# Patient Record
Sex: MALE | Race: WHITE | NOT HISPANIC OR LATINO
[De-identification: names, ages, dates, MRNs, and addresses within clinical notes are randomized per-mention and may not be internally consistent; named-entity substitution may affect disease eponyms.]

---

## 2023-06-16 ENCOUNTER — APPOINTMENT (OUTPATIENT)
Dept: CARDIOLOGY | Facility: CLINIC | Age: 83
End: 2023-06-16
Payer: MEDICARE

## 2023-06-16 ENCOUNTER — NON-APPOINTMENT (OUTPATIENT)
Age: 83
End: 2023-06-16

## 2023-06-16 VITALS
WEIGHT: 173 LBS | SYSTOLIC BLOOD PRESSURE: 108 MMHG | HEART RATE: 72 BPM | DIASTOLIC BLOOD PRESSURE: 62 MMHG | OXYGEN SATURATION: 96 % | BODY MASS INDEX: 32.66 KG/M2 | HEIGHT: 61 IN

## 2023-06-16 DIAGNOSIS — Z87.448 PERSONAL HISTORY OF OTHER DISEASES OF URINARY SYSTEM: ICD-10-CM

## 2023-06-16 DIAGNOSIS — Z72.3 LACK OF PHYSICAL EXERCISE: ICD-10-CM

## 2023-06-16 DIAGNOSIS — Z82.49 FAMILY HISTORY OF ISCHEMIC HEART DISEASE AND OTHER DISEASES OF THE CIRCULATORY SYSTEM: ICD-10-CM

## 2023-06-16 DIAGNOSIS — Z82.0 FAMILY HISTORY OF EPILEPSY AND OTHER DISEASES OF THE NERVOUS SYSTEM: ICD-10-CM

## 2023-06-16 PROCEDURE — 99204 OFFICE O/P NEW MOD 45 MIN: CPT

## 2023-06-16 PROCEDURE — 93000 ELECTROCARDIOGRAM COMPLETE: CPT

## 2023-06-16 RX ORDER — DUTASTERIDE 0.5 MG/1
0.5 CAPSULE, LIQUID FILLED ORAL DAILY
Refills: 0 | Status: ACTIVE | COMMUNITY

## 2023-06-16 RX ORDER — AMLODIPINE BESYLATE 10 MG/1
10 TABLET ORAL DAILY
Qty: 90 | Refills: 1 | Status: ACTIVE | COMMUNITY

## 2023-06-16 RX ORDER — LOSARTAN POTASSIUM AND HYDROCHLOROTHIAZIDE 12.5; 1 MG/1; MG/1
100-12.5 TABLET ORAL DAILY
Qty: 90 | Refills: 1 | Status: ACTIVE | COMMUNITY

## 2023-06-16 RX ORDER — ASPIRIN ENTERIC COATED TABLETS 81 MG 81 MG/1
81 TABLET, DELAYED RELEASE ORAL DAILY
Qty: 90 | Refills: 0 | Status: ACTIVE | COMMUNITY

## 2023-06-27 NOTE — HISTORY OF PRESENT ILLNESS
[FreeTextEntry1] : LEIGHTON JAIME  is a 82 year old  M\par Initial cardiology consultation. \par Previously saw [facility]. Had echocardiogram. Stress test never completed.\par History of chronic kidney disease, renal mass, hypertension, hyperlipidemia. \par Prior ER evaluation for shortness of breath and hypoxia. Nonspecific EKG. \par Reported outside creatinine 1.6. \par Today's blood pressure is well controlled. \par \par There is no prior history of a clinical myocardial infarction, coronary revascularization. \par There is no history of symptomatic congestive heart failure rheumatic heart disease or valvular disease.\par There is no history of symptomatic arrhythmias including atrial fibrillation.\par \par There is no exertional chest pain, pressure or discomfort. \par There is no significant dyspnea on exertion or orthopnea. \par There are no symptomatic palpitations, lightheadedness, dizziness or syncope.\par Wants to increase his cycling.\par \par Outside echocardiogram May 2023 normal left ventricular function mild left ventricular hypertrophy aortic sclerosis mild aortic dilatation. \par August 2022 hemoglobin 17.0 potassium 3.7 creatinine 1.0 total cholesterol 193  TSH 3.8 \par EKG demonstrates normal sinus rhythm with nonspecific ST changes. \par \par Retired from [employer].

## 2023-06-27 NOTE — ADDENDUM
[FreeTextEntry1] : EKG obtained to assist in the diagnosis and management of assisted problem(s).\par \par wife report prior intol to atorva\par mult statins  with symptomatic intolerance. \par Compliant with lifestyle measures including diet exercise\par Excellent candidate for PCSK9 inhibition\par Prior authorization pending\par Will return to office with RN for initial medication administration and education.\par \par

## 2023-06-27 NOTE — ASSESSMENT
[FreeTextEntry1] : \par Prior symptoms of dyspnea. Multiple coronary risk factors. \par Outside records to be reviewed. \par Follow-up stress test. \par Start alternative statin therapy. Prior flank pain with rosuvastatin. \par Low-sodium diet. \par Hold off on exercise program until completion of stress test. \par Instructed to call if adverse effect. \par Adjunctive dietary measures. \par Monitor aortic dimensions. Blood pressure control. \par Follow-up blood work. \par

## 2023-06-28 RX ORDER — ATORVASTATIN CALCIUM 20 MG/1
20 TABLET, FILM COATED ORAL
Qty: 90 | Refills: 0 | Status: DISCONTINUED | COMMUNITY
Start: 2023-06-16 | End: 2023-06-28

## 2023-06-30 ENCOUNTER — NON-APPOINTMENT (OUTPATIENT)
Age: 83
End: 2023-06-30

## 2023-07-05 ENCOUNTER — APPOINTMENT (OUTPATIENT)
Dept: CARDIOLOGY | Facility: CLINIC | Age: 83
End: 2023-07-05
Payer: MEDICARE

## 2023-07-05 DIAGNOSIS — R06.00 DYSPNEA, UNSPECIFIED: ICD-10-CM

## 2023-07-05 PROCEDURE — A9502: CPT

## 2023-07-05 PROCEDURE — 93015 CV STRESS TEST SUPVJ I&R: CPT

## 2023-07-05 PROCEDURE — 78452 HT MUSCLE IMAGE SPECT MULT: CPT

## 2023-07-10 ENCOUNTER — APPOINTMENT (OUTPATIENT)
Dept: CARDIOLOGY | Facility: CLINIC | Age: 83
End: 2023-07-10
Payer: MEDICARE

## 2023-07-10 VITALS
BODY MASS INDEX: 32.85 KG/M2 | WEIGHT: 174 LBS | SYSTOLIC BLOOD PRESSURE: 120 MMHG | HEIGHT: 61 IN | HEART RATE: 81 BPM | DIASTOLIC BLOOD PRESSURE: 66 MMHG | OXYGEN SATURATION: 94 %

## 2023-07-10 DIAGNOSIS — R94.31 ABNORMAL ELECTROCARDIOGRAM [ECG] [EKG]: ICD-10-CM

## 2023-07-10 DIAGNOSIS — I71.20 THORACIC AORTIC ANEURYSM, WITHOUT RUPTURE, UNSPECIFIED: ICD-10-CM

## 2023-07-10 DIAGNOSIS — I10 ESSENTIAL (PRIMARY) HYPERTENSION: ICD-10-CM

## 2023-07-10 DIAGNOSIS — E78.5 HYPERLIPIDEMIA, UNSPECIFIED: ICD-10-CM

## 2023-07-10 PROCEDURE — 99214 OFFICE O/P EST MOD 30 MIN: CPT

## 2023-07-10 RX ORDER — EVOLOCUMAB 140 MG/ML
140 INJECTION, SOLUTION SUBCUTANEOUS
Qty: 2 | Refills: 2 | Status: DISCONTINUED | COMMUNITY
End: 2023-07-10

## 2023-07-10 RX ORDER — PRAVASTATIN SODIUM 20 MG/1
20 TABLET ORAL
Qty: 90 | Refills: 0 | Status: ACTIVE | COMMUNITY
Start: 2023-07-10 | End: 1900-01-01

## 2023-08-05 NOTE — HISTORY OF PRESENT ILLNESS
[FreeTextEntry1] : LEIGHTON JAIME  is a 82 year old  M  Previously saw [facility]. Had echocardiogram. Stress test never completed. History of chronic kidney disease, renal mass, hypertension, hyperlipidemia.  Prior ER evaluation for shortness of breath and hypoxia. Nonspecific EKG.  Reported outside creatinine 1.6.   There is no prior history of a clinical myocardial infarction, coronary revascularization.  There is no history of symptomatic congestive heart failure rheumatic heart disease or valvular disease. There is no history of symptomatic arrhythmias including atrial fibrillation.  There is no exertional chest pain, pressure or discomfort.  There is no significant dyspnea on exertion or orthopnea.  There are no symptomatic palpitations, lightheadedness, dizziness or syncope. Wants to increase his cycling.  stress test reviewed diaphragmatic attenuation no ischemia limited functional status Outside echocardiogram May 2023 normal left ventricular function mild left ventricular hypertrophy aortic sclerosis mild aortic dilatation.  August 2022 hemoglobin 17.0 potassium 3.7 creatinine 1.0 total cholesterol 193  TSH 3.8  EKG demonstrates normal sinus rhythm with nonspecific ST changes.   Retired from [employer].   PCSK9 monoclonal antibody was cost prohibitive

## 2023-08-05 NOTE — ASSESSMENT
[FreeTextEntry1] : trial of alternative low-dose statin therapy instructed to call if adverse effects  now his blood pressure is well controlled ow sodium diet intolerance previously to atorvastatin and rosuvastatin.   Discussed novel nonstatin lipid-lowering therapies with authorization  no significant ischemic heart disease further cardiovascular testing as guided by symptoms  Adjunctive dietary measures.  Monitor aortic dimensions. Blood pressure control.  Follow-up blood work.

## 2024-01-28 ENCOUNTER — NON-APPOINTMENT (OUTPATIENT)
Age: 84
End: 2024-01-28

## 2024-02-13 ENCOUNTER — NON-APPOINTMENT (OUTPATIENT)
Age: 84
End: 2024-02-13

## 2024-07-15 ENCOUNTER — APPOINTMENT (OUTPATIENT)
Dept: CARDIOLOGY | Facility: CLINIC | Age: 84
End: 2024-07-15
Payer: MEDICARE

## 2024-07-15 ENCOUNTER — NON-APPOINTMENT (OUTPATIENT)
Age: 84
End: 2024-07-15

## 2024-07-15 VITALS
BODY MASS INDEX: 31.53 KG/M2 | DIASTOLIC BLOOD PRESSURE: 72 MMHG | SYSTOLIC BLOOD PRESSURE: 122 MMHG | HEART RATE: 79 BPM | OXYGEN SATURATION: 97 % | WEIGHT: 167 LBS | HEIGHT: 61 IN

## 2024-07-15 DIAGNOSIS — I71.20 THORACIC AORTIC ANEURYSM, WITHOUT RUPTURE, UNSPECIFIED: ICD-10-CM

## 2024-07-15 DIAGNOSIS — R00.2 PALPITATIONS: ICD-10-CM

## 2024-07-15 DIAGNOSIS — R55 SYNCOPE AND COLLAPSE: ICD-10-CM

## 2024-07-15 DIAGNOSIS — I10 ESSENTIAL (PRIMARY) HYPERTENSION: ICD-10-CM

## 2024-07-15 DIAGNOSIS — R94.31 ABNORMAL ELECTROCARDIOGRAM [ECG] [EKG]: ICD-10-CM

## 2024-07-15 PROCEDURE — 99214 OFFICE O/P EST MOD 30 MIN: CPT

## 2024-07-15 PROCEDURE — 93246 EXT ECG>7D<15D RECORDING: CPT

## 2024-07-15 PROCEDURE — 93000 ELECTROCARDIOGRAM COMPLETE: CPT | Mod: 59

## 2024-08-14 PROCEDURE — 93248 EXT ECG>7D<15D REV&INTERPJ: CPT

## 2024-08-15 ENCOUNTER — APPOINTMENT (OUTPATIENT)
Dept: CARDIOLOGY | Facility: CLINIC | Age: 84
End: 2024-08-15
Payer: MEDICARE

## 2024-08-15 VITALS
SYSTOLIC BLOOD PRESSURE: 118 MMHG | OXYGEN SATURATION: 98 % | BODY MASS INDEX: 32.28 KG/M2 | HEART RATE: 70 BPM | RESPIRATION RATE: 17 BRPM | DIASTOLIC BLOOD PRESSURE: 60 MMHG | HEIGHT: 61 IN | WEIGHT: 171 LBS

## 2024-08-15 DIAGNOSIS — R00.2 PALPITATIONS: ICD-10-CM

## 2024-08-15 DIAGNOSIS — I10 ESSENTIAL (PRIMARY) HYPERTENSION: ICD-10-CM

## 2024-08-15 DIAGNOSIS — E78.5 HYPERLIPIDEMIA, UNSPECIFIED: ICD-10-CM

## 2024-08-15 DIAGNOSIS — I71.20 THORACIC AORTIC ANEURYSM, WITHOUT RUPTURE, UNSPECIFIED: ICD-10-CM

## 2024-08-15 DIAGNOSIS — R06.00 DYSPNEA, UNSPECIFIED: ICD-10-CM

## 2024-08-15 DIAGNOSIS — R94.31 ABNORMAL ELECTROCARDIOGRAM [ECG] [EKG]: ICD-10-CM

## 2024-08-15 DIAGNOSIS — N28.89 OTHER SPECIFIED DISORDERS OF KIDNEY AND URETER: ICD-10-CM

## 2024-08-15 DIAGNOSIS — R55 SYNCOPE AND COLLAPSE: ICD-10-CM

## 2024-08-15 PROCEDURE — G2211 COMPLEX E/M VISIT ADD ON: CPT

## 2024-08-15 PROCEDURE — 99213 OFFICE O/P EST LOW 20 MIN: CPT

## 2024-08-15 NOTE — CARDIOLOGY SUMMARY
[de-identified] : Trish 7/5/23 diaphragmatic attenuation no ischemia limited functional status [de-identified] : May 2023 normal left ventricular function mild left ventricular hypertrophy aortic sclerosis mild aortic dilatation. [de-identified] : June 2024 K 3.7, Cr 1.25  August 2022 hemoglobin 17.0 potassium 3.7 creatinine 1.0 total cholesterol 193  TSH 3.8

## 2024-08-15 NOTE — HISTORY OF PRESENT ILLNESS
[FreeTextEntry1] : LEIGHTON JAIME is a 83 year old M with PMHx of chronic kidney disease (Reported outside creatinine 1.6.), renal mass, hypertension, hyperlipidemia.  Pt was seen July 2024 with episodes where he does not feel well. He has previous near syncope. There has been no true syncope. This occurred after eating. There are associated palpitations. He feels it starts in his lower chest. Holter monitoring was done: 11 SVT episodes were noted with Avg HR 130s. Symptom button activated for SVT. Otherwise NSR with avg HR 69bpm.  The above results were reviewed with the patient in detail.   Leighton has been feeling well over the last month with improvement in his initial sx.  He has had no recent illnesses or hospitalizations.  He has not had an episode of palpitations or unwell feeling since he hit the symptom activator while wearing the Holter monitoring. He describes episodes as infrequent, and tolerable.   He does not have a formal exercise regimen but remains active with walking and maintaining ADLs. There is no exertional chest pain, pressure or discomfort. There is no significant dyspnea on exertion or orthopnea. There is no symptomatic lightheadedness, dizziness or syncope.   Previously saw  cardiology. Retired from [employer].

## 2024-08-15 NOTE — PHYSICAL EXAM
[Well Developed] : well developed [No Acute Distress] : no acute distress [No Carotid Bruit] : no carotid bruit [Normal S1, S2] : normal S1, S2 [No Murmur] : no murmur [Clear Lung Fields] : clear lung fields [Good Air Entry] : good air entry [Normal Gait] : normal gait [No Edema] : no edema [Moves all extremities] : moves all extremities [Normal Speech] : normal speech [Alert and Oriented] : alert and oriented

## 2024-08-15 NOTE — DISCUSSION/SUMMARY
[FreeTextEntry1] : LEIGHTON JAIME is a 83 year old M who presents today Aug 15, 2024 with the above history and the following active issues:    HTN: Well controlled. Today 118/60. Continue current medication regiment.  HLD: Per pt cholesterol is managed by PCP. We will try to obtain most recent Lipid panel.   Palpitations: Holter monitoring was reviewed with patient in detail. He describes episodes as infrequent, and tolerable.  Discussed the use of beta blocker given his symptoms or if sx were to worsen. He declines any changes to medication regimen as symptoms are not overly bothersome and frequency is rare. He will discuss this with Dr. Koch in the future.   CKD, Renal Mass: Ongoing f/u with Endocrine/PCP. Cr was 1.25 in June 2024.   Pt will be seen as scheduled for Echo/Abd US and f/u OV.  Red flag symptoms which would warrant sooner emergent evaluation reviewed with the patient. All questions and concerns were addressed and answered.   Sincerely,   Shazia Rubi [facility] Patient's history, testing, and plan reviewed with supervising MD: Dr. Kashif Coleman

## 2024-09-10 ENCOUNTER — APPOINTMENT (OUTPATIENT)
Dept: CARDIOLOGY | Facility: CLINIC | Age: 84
End: 2024-09-10
Payer: MEDICARE

## 2024-09-10 PROCEDURE — 93880 EXTRACRANIAL BILAT STUDY: CPT

## 2024-09-10 PROCEDURE — 93306 TTE W/DOPPLER COMPLETE: CPT

## 2024-09-10 PROCEDURE — 93978 VASCULAR STUDY: CPT

## 2024-09-18 ENCOUNTER — APPOINTMENT (OUTPATIENT)
Dept: CARDIOLOGY | Facility: CLINIC | Age: 84
End: 2024-09-18
Payer: MEDICARE

## 2024-09-18 VITALS
BODY MASS INDEX: 32.31 KG/M2 | HEART RATE: 84 BPM | WEIGHT: 171 LBS | DIASTOLIC BLOOD PRESSURE: 68 MMHG | OXYGEN SATURATION: 99 % | SYSTOLIC BLOOD PRESSURE: 120 MMHG

## 2024-09-18 DIAGNOSIS — R00.2 PALPITATIONS: ICD-10-CM

## 2024-09-18 DIAGNOSIS — I10 ESSENTIAL (PRIMARY) HYPERTENSION: ICD-10-CM

## 2024-09-18 DIAGNOSIS — E78.5 HYPERLIPIDEMIA, UNSPECIFIED: ICD-10-CM

## 2024-09-18 DIAGNOSIS — R94.31 ABNORMAL ELECTROCARDIOGRAM [ECG] [EKG]: ICD-10-CM

## 2024-09-18 DIAGNOSIS — I71.20 THORACIC AORTIC ANEURYSM, WITHOUT RUPTURE, UNSPECIFIED: ICD-10-CM

## 2024-09-18 DIAGNOSIS — R06.00 DYSPNEA, UNSPECIFIED: ICD-10-CM

## 2024-09-18 DIAGNOSIS — R55 SYNCOPE AND COLLAPSE: ICD-10-CM

## 2024-09-18 PROCEDURE — 99214 OFFICE O/P EST MOD 30 MIN: CPT

## 2024-09-18 NOTE — CARDIOLOGY SUMMARY
[de-identified] : Trish 7/5/23 diaphragmatic attenuation no ischemia limited functional status [de-identified] : May 2023 normal left ventricular function mild left ventricular hypertrophy aortic sclerosis mild aortic dilatation.

## 2024-09-18 NOTE — HISTORY OF PRESENT ILLNESS
[FreeTextEntry1] : LEIGHTON JAIME is a 84 year old M with PMHx of chronic kidney disease (Reported outside creatinine 1.6.), renal mass, hypertension, hyperlipidemia.  Pt was seen July 2024 with episodes where he does not feel well. He has previous near syncope. There has been no true syncope. This occurred after eating. There are associated palpitations. He feels it starts in his lower chest. Holter monitoring was done: 11 SVT episodes were noted with Avg HR 130s. Symptom button activated for SVT. Otherwise NSR with avg HR 69bpm.  The above results were reviewed with the patient in detail.   Leighton has been feeling well over the last month with improvement in his initial sx.  He has had no recent illnesses or hospitalizations.  He has not had an episode of palpitations or unwell feeling since he hit the symptom activator while wearing the Holter monitoring. He describes episodes as infrequent, and tolerable.  His symptoms did improve with addition of his own supplemental potassium  Event monitor 7/2024 normal sinus rhythm with a symptomatic episode of PAT 14 sec. No AF. Low burden of ectopy.   He does not have a formal exercise regimen but remains active with walking and maintaining ADLs. There is no exertional chest pain, pressure or discomfort. There is no significant dyspnea on exertion or orthopnea. There is no symptomatic lightheadedness, dizziness or syncope.   Previously saw  cardiology. Retired from [employer].   US testing 9/2024:  Echo normal LV systolic function mild MR/TR Abd no AAA Carotid no significant stenosis, mild plaque   June 2024 K 3.7, Cr 1.25  August 2022 hemoglobin 17.0 potassium 3.7 creatinine 1.0 total cholesterol 193  TSH 3.8

## 2024-09-18 NOTE — ASSESSMENT
[FreeTextEntry1] : LEIGHTON JAIME is a 84 year old M who presents today Sep 18, 2024 with the above history and the following active issues:   HTN: Well controlled. Potassium returned to normal pt's OTC supplement on labs scanned from PCP. Continue current medication regimen.  HLD:  PCSK9 monoclonal antibody was cost prohibitive intolerance previously to atorvastatin and rosuvastatin. req last lipids from PCP   Palpitations:  Brief PAT symptomatic, low burden of ectopy on monitor.  Sx improved with electrolyte supplement per pt normal EF   CKD, Renal Mass: Ongoing f/u with Endocrine/PCP. Cr was 1.25 in June 2024.   Annual followup. Further testing as guided by symptoms.  All questions and concerns were addressed and answered.   Sincerely,  POPEYE Dubois Patients history, testing, and plan reviewed with supervising MD: Dr. David Koch